# Patient Record
Sex: MALE | Race: WHITE
[De-identification: names, ages, dates, MRNs, and addresses within clinical notes are randomized per-mention and may not be internally consistent; named-entity substitution may affect disease eponyms.]

---

## 2019-06-19 ENCOUNTER — HOSPITAL ENCOUNTER (INPATIENT)
Dept: HOSPITAL 96 - M.ERS | Age: 34
LOS: 3 days | Discharge: HOME | DRG: 661 | End: 2019-06-22
Attending: HOSPITALIST | Admitting: HOSPITALIST
Payer: COMMERCIAL

## 2019-06-19 VITALS — SYSTOLIC BLOOD PRESSURE: 135 MMHG | DIASTOLIC BLOOD PRESSURE: 99 MMHG

## 2019-06-19 VITALS — SYSTOLIC BLOOD PRESSURE: 127 MMHG | DIASTOLIC BLOOD PRESSURE: 83 MMHG

## 2019-06-19 VITALS — BODY MASS INDEX: 20.35 KG/M2 | HEIGHT: 62.01 IN | WEIGHT: 112 LBS

## 2019-06-19 DIAGNOSIS — Z79.899: ICD-10-CM

## 2019-06-19 DIAGNOSIS — N18.2: ICD-10-CM

## 2019-06-19 DIAGNOSIS — N17.9: ICD-10-CM

## 2019-06-19 DIAGNOSIS — Z84.1: ICD-10-CM

## 2019-06-19 DIAGNOSIS — Z88.0: ICD-10-CM

## 2019-06-19 DIAGNOSIS — N13.2: Primary | ICD-10-CM

## 2019-06-19 DIAGNOSIS — F17.220: ICD-10-CM

## 2019-06-19 DIAGNOSIS — Z83.3: ICD-10-CM

## 2019-06-19 LAB
ABSOLUTE BASOPHILS: 0 THOU/UL (ref 0–0.2)
ABSOLUTE EOSINOPHILS: 0.4 THOU/UL (ref 0–0.7)
ABSOLUTE MONOCYTES: 1.3 THOU/UL (ref 0–1.2)
ANION GAP SERPL CALC-SCNC: 15 MMOL/L (ref 7–16)
BASOPHILS NFR BLD AUTO: 0.3 %
BILIRUB UR-MCNC: NEGATIVE MG/DL
BUN SERPL-MCNC: 18 MG/DL (ref 7–18)
CALCIUM SERPL-MCNC: 9.9 MG/DL (ref 8.5–10.1)
CHLORIDE SERPL-SCNC: 97 MMOL/L (ref 98–107)
CO2 SERPL-SCNC: 28 MMOL/L (ref 21–32)
COLOR UR: YELLOW
CREAT SERPL-MCNC: 1.2 MG/DL (ref 0.6–1.3)
EOSINOPHIL NFR BLD: 2.4 %
GLUCOSE SERPL-MCNC: 110 MG/DL (ref 70–99)
GRANULOCYTES NFR BLD MANUAL: 54.4 %
HCT VFR BLD CALC: 49 % (ref 42–52)
HGB BLD-MCNC: 16.2 GM/DL (ref 14–18)
KETONES UR STRIP-MCNC: (no result) MG/DL
LIPASE: 134 U/L (ref 73–393)
LYMPHOCYTES # BLD: 5 THOU/UL (ref 0.8–5.3)
LYMPHOCYTES NFR BLD AUTO: 33.8 %
MCH RBC QN AUTO: 29.8 PG (ref 26–34)
MCHC RBC AUTO-ENTMCNC: 33 G/DL (ref 28–37)
MCV RBC: 90.1 FL (ref 80–100)
MONOCYTES NFR BLD: 9.1 %
MPV: 8.2 FL. (ref 7.2–11.1)
NEUTROPHILS # BLD: 8.1 THOU/UL (ref 1.6–8.1)
NUCLEATED RBCS: 0 /100WBC
PLATELET COUNT*: 435 THOU/UL (ref 150–400)
POTASSIUM SERPL-SCNC: 3.5 MMOL/L (ref 3.5–5.1)
PROT UR QL STRIP: (no result)
RBC # BLD AUTO: 5.44 MIL/UL (ref 4.5–6)
RBC # UR STRIP: (no result) /UL
RBC #/AREA URNS HPF: (no result) /HPF (ref 0–2)
RDW-CV: 12.1 % (ref 10.5–14.5)
SODIUM SERPL-SCNC: 140 MMOL/L (ref 136–145)
SP GR UR STRIP: <= 1.005 (ref 1–1.03)
SQUAMOUS: (no result) /LPF (ref 0–3)
URINE CLARITY: (no result)
URINE GLUCOSE-RANDOM: NEGATIVE
URINE LEUKOCYTES-REFLEX: NEGATIVE
URINE NITRITE-REFLEX: NEGATIVE
UROBILINOGEN UR STRIP-ACNC: 0.2 E.U./DL (ref 0.2–1)
WBC # BLD AUTO: 14.8 THOU/UL (ref 4–11)

## 2019-06-19 NOTE — OP
[address]  [address]                    OPERATIVE REPORT              
_______________________________________________________________________________
 
Name:       CAROLYN VALE                 Room:           [address] [place]  
.#:  [id number]      Account #:      [id number]  
Admission:  06/19/19     Attend Phys:    Shai Martinez MD    
Discharge:               Date of Birth:  [birth year]  
         Report #: [id number]
                                                                     [id number]  
_______________________________________________________________________________
THIS REPORT FOR:  //name//                      
 
CC: GALINDO physician/PCP
    Shai Martinez
 
DATE OF SERVICE:  06/22/2019
 
 
INDICATIONS:  The patient is a 34-year-old gentleman who was admitted through
the emergency department with severe left-sided flank pain associated with
nausea and vomiting.  Evaluation with CT scan revealed a large left
ureteropelvic junction calculus that measured about 1.3 cm x 6 mm in size. 
After discussing all treatment options in detail including all risks and
benefits of surgical intervention, the patient presents for a cystoscopy, left
ureteroscopic stone extraction with holmium laser lithotripsy and stent
placement.
 
PREOPERATIVE DIAGNOSIS:  Large left ureteral calculus.
 
POSTOPERATIVE DIAGNOSIS:  Large left ureteral calculus.
 
PROCEDURE:  Cystoscopy, left ureteroscopic stone extraction using holmium laser
lithotripsy, left double-J stent placement.
 
SURGEON:  Denis Blood MD
 
ANESTHETIC:  General.
 
COMPLICATIONS:  None.
 
ESTIMATED BLOOD LOSS:  Minimal.
 
DESCRIPTION OF PROCEDURE:  The patient was consented for the above procedures,
given broad-spectrum IV antibiotics preoperatively.  He was given general
anesthetic and placed in a dorsal lithotomy position.  He was prepped and draped
in the usual sterile fashion over the genitalia.  The stone was easily seen on
fluoroscopy.  Cystoscopy was performed with a 22-Dominican sheath and 30 degree
lens, which revealed a normal anterior urethra, minimal bilobar hyperplasia of
the prostate that was only partially obstructing.  Examination of the bladder
itself revealed no evidence of stones, ulcerations or tumors.  The ureteral
orifices were easily identified.  A 0.035 floppy-tipped guidewire was passed up
the left ureter up into the left renal pelvis without difficulty.  This went
right past the stone without difficulty.  Next, a 36 cm 11/13 Dominican ureteral
access sheath was passed over the guidewire into the bladder up the ureter
without event.  With the sheath in place, the trocar and guidewire were removed.
 Next, the flexible ureteroscope was used to perform ureteroscopy without
 
 
 
[place] [address]                    OPERATIVE REPORT              
_______________________________________________________________________________
 
Name:       CAROLYN VALE                 Room:           [address]#:  [id number]      Account #:      [id number]  
Admission:  06/19/19     Attend Phys:    Shai Martinez MD    
Discharge:               Date of Birth:  [birth year]  
         Report #: [id number]
                                                                     [id number]  
_______________________________________________________________________________
difficulty to the level of this where the stone was located.  It was impacted at
the ureteropelvic junction.  Using a 200 micron holmium laser, attempts at
fragmenting the stone were performed; however, upon treating the stone with the
laser, it migrated back up into the renal pelvis, so I deposited the stone in an
upper pole hector and then using the laser, I was able to fragment the stone into
very small passable size pieces.  Stone was relatively large, but broke readily
and I felt that it broke small enough that the pieces were very passable and I
did not choose to try to remove any of these tiny fragments with the basket. 
Visually, I saw no more evidence of any sizable stones.  No stones were seen on
fluoroscopy, so I elected to halt the procedure.  The ureteroscope was removed
leaving the access sheath in place.  The guidewire was replaced under
fluoroscopic guidance and then the access sheath was removed.  Next, the
cystoscope was backloaded over the guidewire and a 4.8 x 28 double-J stent was
passed over the wire with good curl noted in the renal pelvis and in the bladder
after removing the wire.  This was confirmed with fluoroscopy and cystoscopy. 
The bladder was drained.  The scope was removed.  Lidocaine gel was placed per
urethra for local anesthesia.  The patient tolerated the procedure very well,
was awakened and sent to recovery room in stable condition.  We will plan home
as soon as agreeable with the primary service and follow up in 7-10 days with a
KUB and stent removal.
 
 
 
 
 
 
 
 
 
 
 
 
 
 
 
 
 
 
 
 
 
 
 
 
                       
                                        By:                                
                 
D: 06/22/19 1603_______________________________________
T: 06/22/19 1624Derik Blood MD              /nt

## 2019-06-20 VITALS — DIASTOLIC BLOOD PRESSURE: 77 MMHG | SYSTOLIC BLOOD PRESSURE: 136 MMHG

## 2019-06-20 VITALS — DIASTOLIC BLOOD PRESSURE: 85 MMHG | SYSTOLIC BLOOD PRESSURE: 125 MMHG

## 2019-06-20 VITALS — DIASTOLIC BLOOD PRESSURE: 87 MMHG | SYSTOLIC BLOOD PRESSURE: 130 MMHG

## 2019-06-20 VITALS — SYSTOLIC BLOOD PRESSURE: 120 MMHG | DIASTOLIC BLOOD PRESSURE: 70 MMHG

## 2019-06-20 VITALS — DIASTOLIC BLOOD PRESSURE: 68 MMHG | SYSTOLIC BLOOD PRESSURE: 127 MMHG

## 2019-06-20 LAB
ABSOLUTE BASOPHILS: 0.1 THOU/UL (ref 0–0.2)
ABSOLUTE EOSINOPHILS: 0.1 THOU/UL (ref 0–0.7)
ABSOLUTE MONOCYTES: 1 THOU/UL (ref 0–1.2)
ANION GAP SERPL CALC-SCNC: 14 MMOL/L (ref 7–16)
BASOPHILS NFR BLD AUTO: 0.6 %
BUN SERPL-MCNC: 14 MG/DL (ref 7–18)
CALCIUM SERPL-MCNC: 8.9 MG/DL (ref 8.5–10.1)
CHLORIDE SERPL-SCNC: 102 MMOL/L (ref 98–107)
CO2 SERPL-SCNC: 25 MMOL/L (ref 21–32)
CREAT SERPL-MCNC: 0.9 MG/DL (ref 0.6–1.3)
EOSINOPHIL NFR BLD: 0.9 %
GLUCOSE SERPL-MCNC: 101 MG/DL (ref 70–99)
GRANULOCYTES NFR BLD MANUAL: 72.2 %
HCT VFR BLD CALC: 43 % (ref 42–52)
HGB BLD-MCNC: 14.8 GM/DL (ref 14–18)
LYMPHOCYTES # BLD: 2.2 THOU/UL (ref 0.8–5.3)
LYMPHOCYTES NFR BLD AUTO: 18.3 %
MAGNESIUM SERPL-MCNC: 2.1 MG/DL (ref 1.8–2.4)
MCH RBC QN AUTO: 31.1 PG (ref 26–34)
MCHC RBC AUTO-ENTMCNC: 34.5 G/DL (ref 28–37)
MCV RBC: 90.3 FL (ref 80–100)
MONOCYTES NFR BLD: 8 %
MPV: 8.1 FL. (ref 7.2–11.1)
NEUTROPHILS # BLD: 8.9 THOU/UL (ref 1.6–8.1)
NUCLEATED RBCS: 0 /100WBC
PLATELET COUNT*: 351 THOU/UL (ref 150–400)
POTASSIUM SERPL-SCNC: 3.6 MMOL/L (ref 3.5–5.1)
RBC # BLD AUTO: 4.77 MIL/UL (ref 4.5–6)
RDW-CV: 12.3 % (ref 10.5–14.5)
SODIUM SERPL-SCNC: 141 MMOL/L (ref 136–145)
WBC # BLD AUTO: 12.3 THOU/UL (ref 4–11)

## 2019-06-20 NOTE — NUR
NURSE IN PT ROOM, CALLING UROLOGY. TRIAGE NURSE AT UROLOGY CLINIC STATED SHE
WAS UNSURE IF CONSULT FOR PT WENT THROUGH. THIS NURSE STATED CONSULT WAS DONE
AT 0656 THIS AM. TRIAGE NURSE STATED SHE WOULD TAKE PT INFORMATION AND NOTIFY
THE UROLOGIST ON CALL.

## 2019-06-20 NOTE — NUR
PATIENT ARRIVED TO UNIT AT APPROX 1330. ADMISSION HISTORY AND ASSESSMENT
COMPLETED AND AS CHARTED. VSS ON ROOM AIR. COMPLAINTS OF PAIN MANAGED WITH A
COMBINATION OF TORADOL, FENTANYL, AND HYDROCODONE. PATIENT FEELING NAUSEOUS
AND GIVEN ZOFRAN X2, AFTER SECOND DOSE PATIENT CALLED OUT ASKING FOR SOMETHING
ELSE TO HELP WITH NAUSEA. DR FALK PAGED AND GIVEN ORDER FOR PROMETHAZINE.
PATIENT IS UP AD KEM IN THE ROOM. CALL LIGHT IN REACH AND USES AS NEEDED.
HOURLY ROUNDS COMPLETED. WILL CONTINUE TO MONITOR.

## 2019-06-20 NOTE — NUR
NURSE IN ROOM. PT EXPRESSES CONCERN ABOUT NOT SEEING UROLOGIST, WONDERING IF
PROCEDURE WOULD BE PERFORMED TODAY. IF NO PROCEDURE TO BE PERFORMED, PT
WONDERING IF HE COULD GET SOMETHING TO EAT. HAS NOT ATE FOR 24 HOURS, REPORTED
BY PT. NURSE TO CALL UROLOGY AND CHECK.

## 2019-06-20 NOTE — NUR
THIS NURSE STILL HAS NOT HEARD BACK FROM UROLOGIST TRIAGE NURSE. JOSEE ACUÑA MADE
AWARE AND TO CALL UROLOGIST CLINIC. PT RECEIVED INPATIENT BED AND IS BEING
TAKEN TO ROOM BY ASH LUQUE. ASH DODSON TO BE PT INPATIENT NURSE. WEST AWARE OF
THIS NURSE CALLING UROLOGY AND NOT HEARING BACK. PT MADE AWARE OF ALL
HAPPENINGS; STATED APPRECIATION.

## 2019-06-21 VITALS — SYSTOLIC BLOOD PRESSURE: 122 MMHG | DIASTOLIC BLOOD PRESSURE: 88 MMHG

## 2019-06-21 VITALS — SYSTOLIC BLOOD PRESSURE: 141 MMHG | DIASTOLIC BLOOD PRESSURE: 88 MMHG

## 2019-06-21 VITALS — DIASTOLIC BLOOD PRESSURE: 88 MMHG | SYSTOLIC BLOOD PRESSURE: 138 MMHG

## 2019-06-21 LAB
ABSOLUTE BASOPHILS: 0.1 THOU/UL (ref 0–0.2)
ABSOLUTE EOSINOPHILS: 0.2 THOU/UL (ref 0–0.7)
ABSOLUTE MONOCYTES: 1.1 THOU/UL (ref 0–1.2)
ANION GAP SERPL CALC-SCNC: 9 MMOL/L (ref 7–16)
BASOPHILS NFR BLD AUTO: 1 %
BUN SERPL-MCNC: 12 MG/DL (ref 7–18)
CALCIUM SERPL-MCNC: 8.7 MG/DL (ref 8.5–10.1)
CHLORIDE SERPL-SCNC: 103 MMOL/L (ref 98–107)
CO2 SERPL-SCNC: 28 MMOL/L (ref 21–32)
CREAT SERPL-MCNC: 1 MG/DL (ref 0.6–1.3)
EOSINOPHIL NFR BLD: 2.1 %
GLUCOSE SERPL-MCNC: 97 MG/DL (ref 70–99)
GRANULOCYTES NFR BLD MANUAL: 55.5 %
HCT VFR BLD CALC: 41.1 % (ref 42–52)
HGB BLD-MCNC: 13.5 GM/DL (ref 14–18)
LYMPHOCYTES # BLD: 3.7 THOU/UL (ref 0.8–5.3)
LYMPHOCYTES NFR BLD AUTO: 31.9 %
MCH RBC QN AUTO: 29.7 PG (ref 26–34)
MCHC RBC AUTO-ENTMCNC: 32.8 G/DL (ref 28–37)
MCV RBC: 90.7 FL (ref 80–100)
MONOCYTES NFR BLD: 9.5 %
MPV: 8.2 FL. (ref 7.2–11.1)
NEUTROPHILS # BLD: 6.5 THOU/UL (ref 1.6–8.1)
NUCLEATED RBCS: 0 /100WBC
PLATELET COUNT*: 322 THOU/UL (ref 150–400)
POTASSIUM SERPL-SCNC: 4.1 MMOL/L (ref 3.5–5.1)
RBC # BLD AUTO: 4.53 MIL/UL (ref 4.5–6)
RDW-CV: 12.5 % (ref 10.5–14.5)
SODIUM SERPL-SCNC: 140 MMOL/L (ref 136–145)
WBC # BLD AUTO: 11.7 THOU/UL (ref 4–11)

## 2019-06-21 NOTE — NUR
MET WITH PT TO DISCUSS HOME SITUATION/DC PLANNING. PT LIVES WITH FRIEND, WORKS
AND IS INDEPENDENT AND ACTIVE.  USES NO EQUIPMENT. PT DOESN'T HAVE PCP OR
INSURANCE.  COMM[employer] INFO, [place] AND [facility] INFO GIVEN.
PT CONCERNED ABOUT SURGERY, NURSE CALLING URO OFFICE TO DISCUSS PT'S CONCERNS
AND WILL ADDRESS.  DID TALK WITH PT ABOUT GOING TO C IN FUTURE IF NEEDS
ARISE THAT CANNOT BE MANAGED.  PT APPRECIATIVE OF ASSIST

## 2019-06-21 NOTE — NUR
ASSUMED CARE AT START OF SHIFT PT HAVE FAMILY IN ROOM APPEARS COMFORTABLE AT
THAT TIME , LATER PT MEDICATED FOR FLANK PAIN. PT RESTED WELL THROGUHOUT
HOURLY ROUNDS WILL CONTINUE WITH CURRENT PLAN OF CARE AND WILL REPORT CHANGES
PT NPO FOR AM PROCEDURE.

## 2019-06-22 VITALS — SYSTOLIC BLOOD PRESSURE: 144 MMHG | DIASTOLIC BLOOD PRESSURE: 100 MMHG

## 2019-06-22 VITALS — SYSTOLIC BLOOD PRESSURE: 122 MMHG | DIASTOLIC BLOOD PRESSURE: 88 MMHG

## 2019-06-22 LAB
ABSOLUTE BASOPHILS: 0.1 THOU/UL (ref 0–0.2)
ABSOLUTE EOSINOPHILS: 0.4 THOU/UL (ref 0–0.7)
ABSOLUTE MONOCYTES: 1.2 THOU/UL (ref 0–1.2)
ANION GAP SERPL CALC-SCNC: 10 MMOL/L (ref 7–16)
BASOPHILS NFR BLD AUTO: 1.2 %
BUN SERPL-MCNC: 10 MG/DL (ref 7–18)
CALCIUM SERPL-MCNC: 9.5 MG/DL (ref 8.5–10.1)
CHLORIDE SERPL-SCNC: 103 MMOL/L (ref 98–107)
CO2 SERPL-SCNC: 30 MMOL/L (ref 21–32)
CREAT SERPL-MCNC: 0.9 MG/DL (ref 0.6–1.3)
EOSINOPHIL NFR BLD: 3.8 %
GLUCOSE SERPL-MCNC: 96 MG/DL (ref 70–99)
GRANULOCYTES NFR BLD MANUAL: 46.6 %
HCT VFR BLD CALC: 45.4 % (ref 42–52)
HGB BLD-MCNC: 15 GM/DL (ref 14–18)
LYMPHOCYTES # BLD: 4.3 THOU/UL (ref 0.8–5.3)
LYMPHOCYTES NFR BLD AUTO: 37.6 %
MCH RBC QN AUTO: 30.2 PG (ref 26–34)
MCHC RBC AUTO-ENTMCNC: 33.1 G/DL (ref 28–37)
MCV RBC: 91.5 FL (ref 80–100)
MONOCYTES NFR BLD: 10.8 %
MPV: 8.8 FL. (ref 7.2–11.1)
NEUTROPHILS # BLD: 5.4 THOU/UL (ref 1.6–8.1)
NUCLEATED RBCS: 0 /100WBC
PLATELET COUNT*: 349 THOU/UL (ref 150–400)
POTASSIUM SERPL-SCNC: 3.9 MMOL/L (ref 3.5–5.1)
RBC # BLD AUTO: 4.96 MIL/UL (ref 4.5–6)
RDW-CV: 12.5 % (ref 10.5–14.5)
SODIUM SERPL-SCNC: 143 MMOL/L (ref 136–145)
WBC # BLD AUTO: 11.5 THOU/UL (ref 4–11)

## 2019-06-22 PROCEDURE — 0T778DZ DILATION OF LEFT URETER WITH INTRALUMINAL DEVICE, VIA NATURAL OR ARTIFICIAL OPENING ENDOSCOPIC: ICD-10-PCS | Performed by: UROLOGY

## 2019-06-22 PROCEDURE — BT1F1ZZ FLUOROSCOPY OF LEFT KIDNEY, URETER AND BLADDER USING LOW OSMOLAR CONTRAST: ICD-10-PCS | Performed by: UROLOGY

## 2019-06-22 PROCEDURE — 0TC78ZZ EXTIRPATION OF MATTER FROM LEFT URETER, VIA NATURAL OR ARTIFICIAL OPENING ENDOSCOPIC: ICD-10-PCS | Performed by: UROLOGY

## 2019-06-22 NOTE — NUR
ASSUMED CARE OF PATIENT AT APPROX 0730. ALERT AND ORIENTRED X4. ASSESSMENT
COMPLETED AND AS CHARTED. VSS ON ROOM AIR. COMPLAINTS OF PAIN BUT REFUSED
PAINN MEDICATION BECAUSE "I DO NOT WANT TO BE CHARGED EVEN MORE MONEY FOR PAIN
MEDICINE." PATIENT VERY UPSET THAT HIS PROCEDURE HAD BEEN PUSHED BACK DUE TO
EMERGENT SURGERIES THAT TOOK PRIORITY. PATIENT ASKED TO SPEAK TO THE
SUPERVISOR ABOUT HAVING TO WAIT TO HAVE HIS PROCEDURE. PATIENT TAKEN TO PACU
AT 1145 AND RETURNED AT 1650 WITH NO COMPLAINTS OF PAIN AND WANTING TO EAT.
PATIENT FAMILY LEFT TO GET HIM FOOD, HE TOLERATED HIS MEAL AND VOIDED WITHOUT
ISSUES. PATIENT DISCHARGED AT 1800 WITH ALL PERSONAL INFORMATION,
PRERSCRIPTIONS AND DISCHARGE INFORMATION.

## 2019-06-22 NOTE — NUR
PT ALERT AND ORIENTED. VITALS STABLE. MEDS GIVEN AS ORDERED. PAIN WELL
CONTROLLED WITH HYDROCODONE. NO NAUSEA OR VOMITING. NPO AT MIDNIGHT FOR
PROCEDURE TODAY. HOURLY ROUNDING COMPLETED. WILL CONTINUE TO MONITOR.